# Patient Record
Sex: FEMALE | Race: WHITE | Employment: UNEMPLOYED
[De-identification: names, ages, dates, MRNs, and addresses within clinical notes are randomized per-mention and may not be internally consistent; named-entity substitution may affect disease eponyms.]

---
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
## 2018-01-08 ENCOUNTER — OFFICE VISIT (OUTPATIENT)
Dept: OBGYN | Age: 18
End: 2018-01-08
Payer: COMMERCIAL

## 2018-01-08 VITALS
DIASTOLIC BLOOD PRESSURE: 81 MMHG | HEART RATE: 81 BPM | HEIGHT: 67 IN | BODY MASS INDEX: 38.81 KG/M2 | RESPIRATION RATE: 18 BRPM | TEMPERATURE: 97.3 F | WEIGHT: 247.3 LBS | SYSTOLIC BLOOD PRESSURE: 114 MMHG

## 2018-01-08 DIAGNOSIS — N93.9 ABNORMAL UTERINE BLEEDING (AUB): Primary | ICD-10-CM

## 2018-01-08 PROCEDURE — 99212 OFFICE O/P EST SF 10 MIN: CPT | Performed by: STUDENT IN AN ORGANIZED HEALTH CARE EDUCATION/TRAINING PROGRAM

## 2018-01-08 PROCEDURE — 1036F TOBACCO NON-USER: CPT | Performed by: STUDENT IN AN ORGANIZED HEALTH CARE EDUCATION/TRAINING PROGRAM

## 2018-01-08 PROCEDURE — G8484 FLU IMMUNIZE NO ADMIN: HCPCS | Performed by: STUDENT IN AN ORGANIZED HEALTH CARE EDUCATION/TRAINING PROGRAM

## 2018-01-08 PROCEDURE — G8427 DOCREV CUR MEDS BY ELIG CLIN: HCPCS | Performed by: STUDENT IN AN ORGANIZED HEALTH CARE EDUCATION/TRAINING PROGRAM

## 2018-01-08 PROCEDURE — G8417 CALC BMI ABV UP PARAM F/U: HCPCS | Performed by: STUDENT IN AN ORGANIZED HEALTH CARE EDUCATION/TRAINING PROGRAM

## 2018-01-08 NOTE — PROGRESS NOTES
Date: 2018  Time: 8:35 AM      Patient Name: Ryan Ng  Patient : 2000  Room/Bed: Room/bed info not found  Admission Date/Time: No admission date for patient encounter. Attending Physician Statement  I have discussed the care of Ryan Ng, including pertinent history and exam findings with the resident. I have reviewed and edited their note in the electronic medical record. The key elements of all parts of the encounter have been performed/reviewed by me . I agree with the assessment, plan and orders as documented by the resident. The level of care submitted represents to the best of my ability the care documented in the medical record today. GC Modifier. This service has been performed in part by a resident under the direction of a teaching physician. Patient was seen today for IUD string check 6 weeks post insertion. IUD string visualized in the cervical os. No acute complaints. Patient to return for annual gyn exam in 2018 or sooner PRN.     Attending's Name:  Braulio Garcia MD

## 2018-01-08 NOTE — PROGRESS NOTES
OB/GYN Problem Visit    Dimas Tompkins  2018                       Primary Care Physician: Essie Marrero MD (Inactive)    CC:   Chief Complaint   Patient presents with    Follow-up         HPI: Dimas Tompkins is a 21 y.o. female     The patient was seen and examined. She is here for a 6 week Mirena IUD string check. IUD was placed 2017 without difficulty due to AUB. Patient states she still has cramping every once in awhile, but it is relieved with Ibuprofen. She states \"it is not that bad. \" She is also spotting still, but states that it much less than her period. She reports that she is happy with her IUD thus far. Denies dizziness, light headedness, chest pain, SOB, abdominal pain, N/V, fevers, chills, vaginal discharge.     REVIEW OF SYSTEMS:   Constitutional: negative fever, negative chills  HEENT: negative visual disturbances, negative headaches  Respiratory: negative dyspnea, negative cough  Cardiovascular: negative chest pain,  negative palpitations  Gastrointestinal: negative abdominal pain, negative RUQ pain, negative N/V, negative diarrhea, negative constipation  Genitourinary: negative dysuria, negative vaginal discharge, negative vaginal bleeding  Dermatological: negative rash, negative wounds  Hematologic: negative bleeding/clotting disorder  Immunologic: negative recent illness, negative recent sick contact, negative allergic reactions  Lymphatic: negative lymph nodes  Musculoskeletal: negative back pain, negative myalgias, negative arthralgias  Neurological:  negative dizziness, negative weakness  Behavior/Psych: negative depression, negative anxiety  _________________________________________________________________    GYNECOLOGICAL HISTORY:  Age of Menarche: 11  Age of Menopause: n/a      Sexually Active: not sexually active but has been in the past  STD History: no past history     Pap History: n/a  Colposcopy History: denies     Permanent Sterilization: no  Reversible